# Patient Record
Sex: MALE | Race: WHITE | NOT HISPANIC OR LATINO
[De-identification: names, ages, dates, MRNs, and addresses within clinical notes are randomized per-mention and may not be internally consistent; named-entity substitution may affect disease eponyms.]

---

## 2018-01-19 PROBLEM — Z00.00 ENCOUNTER FOR PREVENTIVE HEALTH EXAMINATION: Status: ACTIVE | Noted: 2018-01-19

## 2022-04-04 ENCOUNTER — APPOINTMENT (OUTPATIENT)
Dept: NEUROSURGERY | Facility: CLINIC | Age: 52
End: 2022-04-04
Payer: COMMERCIAL

## 2022-04-04 PROCEDURE — 99205 OFFICE O/P NEW HI 60 MIN: CPT

## 2022-04-14 ENCOUNTER — TRANSCRIPTION ENCOUNTER (OUTPATIENT)
Age: 52
End: 2022-04-14

## 2022-04-20 ENCOUNTER — TRANSCRIPTION ENCOUNTER (OUTPATIENT)
Age: 52
End: 2022-04-20

## 2023-01-04 DIAGNOSIS — I67.1 CEREBRAL ANEURYSM, NONRUPTURED: ICD-10-CM

## 2023-04-06 ENCOUNTER — RESULT REVIEW (OUTPATIENT)
Age: 53
End: 2023-04-06

## 2023-05-30 ENCOUNTER — APPOINTMENT (OUTPATIENT)
Dept: NEUROSURGERY | Facility: CLINIC | Age: 53
End: 2023-05-30
Payer: COMMERCIAL

## 2023-05-30 PROCEDURE — 99215 OFFICE O/P EST HI 40 MIN: CPT | Mod: 95

## 2023-05-30 NOTE — ASSESSMENT
[FreeTextEntry1] : Ms. 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  Alternative management strategies discussed. Diagnostic cerebral angiogram recommended to better characterize size, morphology, and location of aneurysms in order to inform management strategy. Risks including but not limited to bleeding, infection, vascular injury, life threatening hematoma, limb ischemia, need for surgical repair, renal failure, stroke, coma/death, weakness/paralysis, visual/sensory loss, loss of speech/language/cognitive/memory function, changes in personality/behavior, failure of procedure, need for additional procedures discussed. The patient understands the risks, benefits, alternatives, and is considering my recommendations. If he wishes to proceed, we will then schedule him for a diagnostic cerebral angiogram with conscious sedation. If he decides not to proceed, I have recommended MRA brain in one year for surveillance. He understands that our medical recommendation is to proceed with diagnostic cerebral angiography.\par \par I have asked the patient to contact me for any symptomatic development or progression in the interim at which time we can obtain expedited follow up imaging.\par \par A total of 45 minutes were spent relative to this encounter. \par

## 2023-05-30 NOTE — DATA REVIEWED
[de-identified] : MRA BRAIN: 4/12/23: IMPRESSION-  3 mm left-sided supraclinoid aneurysm which projects anteriorly and  \par  superiorly. CTA of the head is suggested for further evaluation.

## 2023-05-30 NOTE — HISTORY OF PRESENT ILLNESS
[FreeTextEntry1] : Ms. Vogt has agreed to two-way audiovisual telehealth consultation. He is located at his home [address] [place] [address] and I am located at my office at [facility].\par \par \par Mr. Vogt presents in neurosurgical follow up. Previous notes and interval history reviewed. Surveillance MRA detailed below. Denies new neurological symptoms. \par \par \par \par